# Patient Record
Sex: FEMALE | Race: WHITE | Employment: UNEMPLOYED | URBAN - METROPOLITAN AREA
[De-identification: names, ages, dates, MRNs, and addresses within clinical notes are randomized per-mention and may not be internally consistent; named-entity substitution may affect disease eponyms.]

---

## 2020-12-31 ENCOUNTER — HOSPITAL ENCOUNTER (EMERGENCY)
Age: 40
Discharge: HOME | End: 2020-12-31
Attending: EMERGENCY MEDICINE
Payer: OTHER GOVERNMENT

## 2020-12-31 VITALS
RESPIRATION RATE: 20 BRPM | TEMPERATURE: 97.6 F | HEART RATE: 82 BPM | DIASTOLIC BLOOD PRESSURE: 94 MMHG | OXYGEN SATURATION: 98 % | SYSTOLIC BLOOD PRESSURE: 148 MMHG

## 2020-12-31 DIAGNOSIS — M54.41 ACUTE BILATERAL LOW BACK PAIN WITH BILATERAL SCIATICA: Primary | ICD-10-CM

## 2020-12-31 DIAGNOSIS — M54.42 ACUTE BILATERAL LOW BACK PAIN WITH BILATERAL SCIATICA: Primary | ICD-10-CM

## 2020-12-31 LAB — HCG UR QL: NEGATIVE

## 2020-12-31 PROCEDURE — 81025 URINE PREGNANCY TEST: CPT

## 2020-12-31 PROCEDURE — 81003 URINALYSIS AUTO W/O SCOPE: CPT

## 2020-12-31 PROCEDURE — 74011250636 HC RX REV CODE- 250/636: Performed by: EMERGENCY MEDICINE

## 2020-12-31 PROCEDURE — 96372 THER/PROPH/DIAG INJ SC/IM: CPT

## 2020-12-31 PROCEDURE — 99284 EMERGENCY DEPT VISIT MOD MDM: CPT

## 2020-12-31 PROCEDURE — 74011250637 HC RX REV CODE- 250/637: Performed by: EMERGENCY MEDICINE

## 2020-12-31 RX ORDER — HYDROCODONE BITARTRATE AND ACETAMINOPHEN 7.5; 325 MG/1; MG/1
1 TABLET ORAL
Status: COMPLETED | OUTPATIENT
Start: 2020-12-31 | End: 2020-12-31

## 2020-12-31 RX ORDER — HYDROMORPHONE HYDROCHLORIDE 1 MG/ML
1 INJECTION, SOLUTION INTRAMUSCULAR; INTRAVENOUS; SUBCUTANEOUS
Status: COMPLETED | OUTPATIENT
Start: 2020-12-31 | End: 2020-12-31

## 2020-12-31 RX ORDER — PROMETHAZINE HYDROCHLORIDE 25 MG/1
25 TABLET ORAL
Qty: 12 TAB | Refills: 0 | Status: SHIPPED | OUTPATIENT
Start: 2020-12-31

## 2020-12-31 RX ORDER — ONDANSETRON 4 MG/1
4 TABLET, ORALLY DISINTEGRATING ORAL
Qty: 10 TAB | Refills: 0 | Status: SHIPPED | OUTPATIENT
Start: 2020-12-31

## 2020-12-31 RX ORDER — HYDROCODONE BITARTRATE AND ACETAMINOPHEN 7.5; 325 MG/1; MG/1
1 TABLET ORAL
Qty: 8 TAB | Refills: 0 | Status: SHIPPED | OUTPATIENT
Start: 2020-12-31 | End: 2021-01-02

## 2020-12-31 RX ORDER — PROMETHAZINE HYDROCHLORIDE 25 MG/ML
25 INJECTION, SOLUTION INTRAMUSCULAR; INTRAVENOUS
Status: COMPLETED | OUTPATIENT
Start: 2020-12-31 | End: 2020-12-31

## 2020-12-31 RX ADMIN — PROMETHAZINE HYDROCHLORIDE 25 MG: 25 INJECTION INTRAMUSCULAR; INTRAVENOUS at 13:21

## 2020-12-31 RX ADMIN — HYDROMORPHONE HYDROCHLORIDE 1 MG: 1 INJECTION, SOLUTION INTRAMUSCULAR; INTRAVENOUS; SUBCUTANEOUS at 13:21

## 2020-12-31 RX ADMIN — HYDROCODONE BITARTRATE AND ACETAMINOPHEN 1 TABLET: 7.5; 325 TABLET ORAL at 14:15

## 2020-12-31 NOTE — ED PROVIDER NOTES
18year-old female has a history of degenerative disc disease in her back. Has issues with bilateral sciatica. She used to go to pain management in the past.  She has had a flareup of her back pain with the radiating down both legs over the last few days. Slight dysuria. No urinary or fecal incontinence. No particular numbness or weakness in her legs. She is ambulatory. She states this pain feels like the sciatica she used to have. She used to have injections. From [place] visiting here due to daughter having a baby. The history is provided by the patient. Back Pain   This is a new problem. The current episode started more than 2 days ago. The problem has not changed since onset. The problem occurs constantly. The pain is associated with no known injury. The pain is present in the lower back, left side and right side. The quality of the pain is described as aching, burning and dull. The pain radiates to the left foot and right foot. The pain is moderate. The symptoms are aggravated by twisting and bending. Associated symptoms include dysuria. Pertinent negatives include no chest pain, no fever, no numbness, no abdominal pain, no bladder incontinence, no paresthesias and no weakness. She has tried muscle relaxants for the symptoms. The patient's surgical history includes epidural.       Past Medical History:   Diagnosis Date    Arthritis     H/O back injury     Headache     Hx of neck injury     Joint pain     Meniere disease     Nausea & vomiting        No past surgical history on file.       Family History:   Problem Relation Age of Onset    Cancer Mother     Cancer Father     Heart Disease Paternal Grandfather     Stroke Paternal Grandfather        Social History     Socioeconomic History    Marital status:      Spouse name: Not on file    Number of children: Not on file    Years of education: Not on file    Highest education level: Not on file   Occupational History    Not on file   Social Needs    Financial resource strain: Not on file    Food insecurity     Worry: Not on file     Inability: Not on file    Transportation needs     Medical: Not on file     Non-medical: Not on file   Tobacco Use    Smoking status: Current Every Day Smoker   Substance and Sexual Activity    Alcohol use: Yes     Alcohol/week: 0.8 standard drinks     Types: 1 drink(s) per week    Drug use: Not on file    Sexual activity: Not on file   Lifestyle    Physical activity     Days per week: Not on file     Minutes per session: Not on file    Stress: Not on file   Relationships    Social connections     Talks on phone: Not on file     Gets together: Not on file     Attends Orthodoxy service: Not on file     Active member of club or organization: Not on file     Attends meetings of clubs or organizations: Not on file     Relationship status: Not on file    Intimate partner violence     Fear of current or ex partner: Not on file     Emotionally abused: Not on file     Physically abused: Not on file     Forced sexual activity: Not on file   Other Topics Concern    Not on file   Social History Narrative    Not on file         ALLERGIES: Pcn [penicillins] and Codeine    Review of Systems   Constitutional: Negative for chills and fever. Cardiovascular: Negative for chest pain. Gastrointestinal: Positive for nausea and vomiting (Vomiting from the pain according to the patient). Negative for abdominal pain and constipation. Genitourinary: Positive for dysuria. Negative for bladder incontinence, decreased urine volume, difficulty urinating and frequency. Musculoskeletal: Positive for back pain. Skin: Negative for color change and rash. Neurological: Negative for weakness, numbness and paresthesias. Vitals:    12/31/20 1128   BP: (!) 150/91   Pulse: 99   Resp: 18   Temp: 97.6 °F (36.4 °C)   SpO2: 97%            Physical Exam  Vitals signs and nursing note reviewed.    Constitutional: General: She is in acute distress. Appearance: She is not ill-appearing. Musculoskeletal:      Lumbar back: She exhibits decreased range of motion and tenderness. She exhibits no bony tenderness. Skin:     General: Skin is warm and dry. Neurological:      Mental Status: She is alert. Deep Tendon Reflexes:      Reflex Scores:       Patellar reflexes are 2+ on the right side and 2+ on the left side. Achilles reflexes are 1+ on the right side and 1+ on the left side. Comments: Ambulatory. MDM  Number of Diagnoses or Management Options  Diagnosis management comments: No red flags to suggest imaging. Amount and/or Complexity of Data Reviewed  Review and summarize past medical records: yes (Seen in another hospital last evening. Received injection of Toradol. Prescription for a Medrol Dosepak. Reviewed [place] . No prescriptions in state. Patient was on Suboxone up until about 2 years ago. Previously pain management years before that.   No prescriptions within the last 2 years.)    Risk of Complications, Morbidity, and/or Mortality  Presenting problems: moderate  Diagnostic procedures: minimal  Management options: low    Patient Progress  Patient progress: stable         Procedures

## 2020-12-31 NOTE — ED TRIAGE NOTES
Patient ambulatory to triage without difficulty; mask in place. Patient reports pain in lower back down both legs. Patient reports hx of sciatic nerve issues and states she gets nerve ablation. Patient states pain began 3 days ago.

## 2020-12-31 NOTE — DISCHARGE INSTRUCTIONS
Call your doctor when you arrive home to arrange appointment to recheck and further treatments. Recheck for fever or urinary incontinence.